# Patient Record
Sex: FEMALE | Race: BLACK OR AFRICAN AMERICAN | NOT HISPANIC OR LATINO
[De-identification: names, ages, dates, MRNs, and addresses within clinical notes are randomized per-mention and may not be internally consistent; named-entity substitution may affect disease eponyms.]

---

## 2017-09-04 PROBLEM — Z00.00 ENCOUNTER FOR PREVENTIVE HEALTH EXAMINATION: Status: ACTIVE | Noted: 2017-09-04

## 2019-01-12 ENCOUNTER — APPOINTMENT (OUTPATIENT)
Dept: ORTHOPEDIC SURGERY | Facility: CLINIC | Age: 64
End: 2019-01-12
Payer: COMMERCIAL

## 2019-01-12 VITALS
BODY MASS INDEX: 28.32 KG/M2 | HEIGHT: 65 IN | HEART RATE: 88 BPM | SYSTOLIC BLOOD PRESSURE: 165 MMHG | DIASTOLIC BLOOD PRESSURE: 99 MMHG | WEIGHT: 170 LBS

## 2019-01-12 DIAGNOSIS — Z78.9 OTHER SPECIFIED HEALTH STATUS: ICD-10-CM

## 2019-01-12 DIAGNOSIS — M75.121 COMPLETE ROTATOR CUFF TEAR OR RUPTURE OF RIGHT SHOULDER, NOT SPECIFIED AS TRAUMATIC: ICD-10-CM

## 2019-01-12 PROCEDURE — 99204 OFFICE O/P NEW MOD 45 MIN: CPT

## 2019-01-12 PROCEDURE — 73030 X-RAY EXAM OF SHOULDER: CPT | Mod: RT

## 2019-01-12 RX ORDER — MELOXICAM 15 MG/1
15 TABLET ORAL
Qty: 30 | Refills: 1 | Status: ACTIVE | COMMUNITY
Start: 2019-01-12 | End: 1900-01-01

## 2019-01-12 NOTE — PHYSICAL EXAM
[de-identified] : Patient is well appearing, in non acute distress with nonlabored breathing and appropriate mood and affect. Extra-ocular movements intact and no nasal discharge.\par \par Right UE warm and well perfused; palpable radial pulse\par SILT C5-T1\par motor intact to finger flexion, wrist extension and flexion, elbow flexion and extension, supination and pronation, deltoid\par Patient has significant pain with deltoid testing with 3/5 motor strength\par \par Left UE warm and well perfused; palpable radial pulse\par SILT C5-T1\par motor intact to finger flexion, wrist extension and flexion, elbow flexion and extension, supination and pronation, deltoid\par \par Right Shoulder \par \par Appearance\par negative atrophy of musculature\par negative winging\par \par Tenderness to Palpation about the shoulder is significantly tender at greater tuberosity\par \par Range of Motion: Active / Passive\par Forward Elevation: 30/70 with a lot of pain\par Abduction: 30/60 with a lot of pain\par ER at 0 degrees of abduction: 15/20\par ER at 90 degrees of abduction: Unable to test\par Internal Rotation L1\par \par Motor Strength unable test secondary to significant pain\par \par Provocative Maneuvers unable to test secondary to significant pain\par \par Left Shoulder \par \par Appearance\par negative atrophy of musculature\par negative winging\par \par Tenderness to Palpation about the shoulder is negative\par \par Range of Motion: Active / Passive\par Forward Elevation: full \par Abduction: full \par ER at 0 degrees of abduction: full \par ER at 90 degrees of abduction: full \par Internal Rotation full\par  [de-identified] : Right Shoulder Xray was visualized and reviewed by me\par well preserved glenohumeral joint space with narrowing of acromioclavicular joint space\par no fracture or dislocation\par calcification seen in soft tissue and subacromial space that appears to be more consistent with calcific tendinitis vs fracture, particularly given no traumatic injury; however, on scap y view, there is a second area of calcification that is concerning for fx

## 2019-01-12 NOTE — CONSULT LETTER
[Dear  ___] : Dear  [unfilled], [Consult Letter:] : I had the pleasure of evaluating your patient, [unfilled]. [( Thank you for referring [unfilled] for consultation for _____ )] : Thank you for referring [unfilled] for consultation for [unfilled] [Please see my note below.] : Please see my note below. [Consult Closing:] : Thank you very much for allowing me to participate in the care of this patient.  If you have any questions, please do not hesitate to contact me. [Sincerely,] : Sincerely, [FreeTextEntry2] : CHAR LUZ,EMILIO JENKINS\par  [FreeTextEntry3] : Moraima Grant MD\par [facility]\par [address]\par [place] [id number]\par Tel [phone]\par

## 2019-01-12 NOTE — REVIEW OF SYSTEMS
[Joint Pain] : joint pain [Joint Stiffness] : joint stiffness [Negative] : Heme/Lymph [FreeTextEntry9] : Right shoulder

## 2019-01-12 NOTE — HISTORY OF PRESENT ILLNESS
[de-identified] : Ms. MARIE is a 63 year old female who presents with Right shoulder pain. Patient states she was transferring a patient from the wheelchair to the bed. Started 2 weeks ago.  Pain did not start right away; started feeling pain a day later.  Pain is at upper lateral shoulder.  No DM.  No thyroid issues.  Went to PCP and was given muscle relaxer and ibuprofen 500mg.  These have helped and pain has lessened.  No PT.  No injs.  No pain in shoulder prior to this.  \par \par Hand Dominance: RHD\par Location: Right shoulder\par Timin weeks\par Quality: burning\par Severity:10/10\par Treatments Tried: ice, heat, tiger balm, ibuprofen\par Associated Signs and Symptoms: worst at night, wakes patient from sleep\par What makes it worse: lifting, movement\par What makes it better:rest\par Improving/Worsening/Unchanged: improving\par PT hx: no pt\par

## 2019-01-12 NOTE — ASSESSMENT
[FreeTextEntry1] : Patient is a 63-year-old female with acute onset of right shoulder pain after attempting to transfer patient to weeks ago. Patient has signs and symptoms concerning for a complete rotator cuff tear versus fracture versus calcific tendinitis. We'll order an MRI to better evaluate for this. I have also ordered meloxicam for patient and instructed her to stop using ibuprofen and other NSAIDs.  Risks discussed. The patient will followup after MRI

## 2019-01-27 ENCOUNTER — OUTPATIENT (OUTPATIENT)
Dept: OUTPATIENT SERVICES | Facility: HOSPITAL | Age: 64
LOS: 1 days | End: 2019-01-27
Payer: COMMERCIAL

## 2019-01-27 ENCOUNTER — APPOINTMENT (OUTPATIENT)
Dept: MRI IMAGING | Facility: CLINIC | Age: 64
End: 2019-01-27
Payer: COMMERCIAL

## 2019-01-27 DIAGNOSIS — M75.121 COMPLETE ROTATOR CUFF TEAR OR RUPTURE OF RIGHT SHOULDER, NOT SPECIFIED AS TRAUMATIC: ICD-10-CM

## 2019-01-27 DIAGNOSIS — M75.31 CALCIFIC TENDINITIS OF RIGHT SHOULDER: ICD-10-CM

## 2019-01-27 PROCEDURE — 73221 MRI JOINT UPR EXTREM W/O DYE: CPT

## 2019-01-27 PROCEDURE — 73221 MRI JOINT UPR EXTREM W/O DYE: CPT | Mod: 26,RT

## 2019-02-28 ENCOUNTER — APPOINTMENT (OUTPATIENT)
Dept: ORTHOPEDIC SURGERY | Facility: CLINIC | Age: 64
End: 2019-02-28
Payer: COMMERCIAL

## 2019-02-28 VITALS
BODY MASS INDEX: 28.32 KG/M2 | DIASTOLIC BLOOD PRESSURE: 101 MMHG | HEART RATE: 110 BPM | WEIGHT: 170 LBS | SYSTOLIC BLOOD PRESSURE: 158 MMHG | HEIGHT: 65 IN

## 2019-02-28 PROCEDURE — 99214 OFFICE O/P EST MOD 30 MIN: CPT

## 2019-02-28 NOTE — ASSESSMENT
[FreeTextEntry1] : Patient is a 63-year-old female who presents for followup of right shoulder pain. Patient has been taking meloxicam every few days with significant relief. Her range of motion is very good but she does have some weakness in her rotator cuff muscles. Her MRI her rotator cuff is intact. I recommended patient continue meloxicam and she may try a daily for 2-4 weeks and then after that as needed; was discussed. Additionally I have ordered therapy to help with the improvement of her shoulder motion and strength. Patient agrees with plan and will followup as needed.

## 2019-02-28 NOTE — PHYSICAL EXAM
[de-identified] : Patient is well appearing, in non acute distress with nonlabored breathing and appropriate mood and affect. Extra-ocular movements intact and no nasal discharge.\par \par Right UE warm and well perfused; palpable radial pulse\par SILT C5-T1\par motor intact to finger flexion, wrist extension and flexion, elbow flexion and extension, supination and pronation, deltoid\par \par Left UE warm and well perfused; palpable radial pulse\par SILT C5-T1\par motor intact to finger flexion, wrist extension and flexion, elbow flexion and extension, supination and pronation, deltoid\par \par \par Right Shoulder \par \par Appearance\par negative atrophy of musculature\par negative winging\par \par Tenderness to Palpation about the shoulder is negative\par \par Range of Motion: Active / Passive\par Forward Elevation: full \par Abduction: full \par ER at 0 degrees of abduction: full \par ER at 90 degrees of abduction: full \par Internal Rotation full\par \par Motor Strength\par Supraspinatus: 3/5\par Infraspinatus: 5/5\par Subscapularis: 5 /5\par Teres Minor: 5/ 5\par \par Provocative Maneuvers\par negative Drop Arm\par negative Belly Press\par negative Hornblowers\par Positive Kwon\par Positive Neer\par negative Speeds [de-identified] : Patient had an MRI of her right shoulder on January 27, 2019. The images are visualized are reviewed by me. Findings below:\par \par Along the anterior mid-insertional fibers of supraspinatus tendon there are foci globular hypointense signal consistent with calcific tendinosis. A portion of this deposit erosions the adjacent greater tuberosity where there is mild surrounding osseous edema. This deposit measures approximately 1.2 x 1 x 1.2 cm in total. There is mild surrounding tristen-tenderness edema and moderate subacromial subdeltoid bursitis. There is moderate supraspinatus tendinosis without discrete tear. Infraspinatus and teres minor tendons are intact. There is mild tendinosis of the subscapularis tendon without discrete tear. There's no fatty atrophy of the rotator cuff muscles. There is mild fatty infiltration of the lateral and posterior fibers the deltoid muscle. Long head of the biceps tendon is intact. There is increased signal undercutting the chondral labral junction of the anterior labrum consistent with a partial thickness nondisplaced labral tear. There is mild thickening and edema within the anterior band of the right EHL ligament which is nonspecific but to be seen with adhesive capsulitis the moderate a.c. joint arthrosis.

## 2019-02-28 NOTE — HISTORY OF PRESENT ILLNESS
[de-identified] : Ms. MARIE is a 63 year old female who is being seen for Follow-up of Right shoulder. Patient states pain has improved about 70 percent. Movement has improved as well. Patient takes meloxicam prn; every 2-3 days.  Getting better.  Sometimes it bothers her at night still but meloxicam helps.  \par

## 2019-03-02 ENCOUNTER — EMERGENCY (EMERGENCY)
Facility: HOSPITAL | Age: 64
LOS: 1 days | Discharge: HOME | End: 2019-03-02
Attending: EMERGENCY MEDICINE | Admitting: EMERGENCY MEDICINE
Payer: SELF-PAY

## 2019-03-02 VITALS
WEIGHT: 175.93 LBS | SYSTOLIC BLOOD PRESSURE: 164 MMHG | HEIGHT: 65 IN | HEART RATE: 78 BPM | TEMPERATURE: 99 F | RESPIRATION RATE: 16 BRPM | DIASTOLIC BLOOD PRESSURE: 87 MMHG | OXYGEN SATURATION: 100 %

## 2019-03-02 VITALS
DIASTOLIC BLOOD PRESSURE: 82 MMHG | OXYGEN SATURATION: 97 % | SYSTOLIC BLOOD PRESSURE: 121 MMHG | HEART RATE: 75 BPM | RESPIRATION RATE: 16 BRPM | TEMPERATURE: 99 F

## 2019-03-02 LAB
ALBUMIN SERPL ELPH-MCNC: 3.7 G/DL — SIGNIFICANT CHANGE UP (ref 3.3–5)
ALP SERPL-CCNC: 92 U/L — SIGNIFICANT CHANGE UP (ref 30–120)
ALT FLD-CCNC: 20 U/L DA — SIGNIFICANT CHANGE UP (ref 10–60)
ANION GAP SERPL CALC-SCNC: 7 MMOL/L — SIGNIFICANT CHANGE UP (ref 5–17)
AST SERPL-CCNC: 16 U/L — SIGNIFICANT CHANGE UP (ref 10–40)
BASOPHILS # BLD AUTO: 0.07 K/UL — SIGNIFICANT CHANGE UP (ref 0–0.2)
BASOPHILS NFR BLD AUTO: 0.9 % — SIGNIFICANT CHANGE UP (ref 0–2)
BILIRUB SERPL-MCNC: 0.4 MG/DL — SIGNIFICANT CHANGE UP (ref 0.2–1.2)
BUN SERPL-MCNC: 13 MG/DL — SIGNIFICANT CHANGE UP (ref 7–23)
CALCIUM SERPL-MCNC: 9.3 MG/DL — SIGNIFICANT CHANGE UP (ref 8.4–10.5)
CHLORIDE SERPL-SCNC: 102 MMOL/L — SIGNIFICANT CHANGE UP (ref 96–108)
CO2 SERPL-SCNC: 30 MMOL/L — SIGNIFICANT CHANGE UP (ref 22–31)
CREAT SERPL-MCNC: 0.68 MG/DL — SIGNIFICANT CHANGE UP (ref 0.5–1.3)
EOSINOPHIL # BLD AUTO: 0.3 K/UL — SIGNIFICANT CHANGE UP (ref 0–0.5)
EOSINOPHIL NFR BLD AUTO: 3.9 % — SIGNIFICANT CHANGE UP (ref 0–6)
GLUCOSE SERPL-MCNC: 114 MG/DL — HIGH (ref 70–99)
HCT VFR BLD CALC: 34.6 % — SIGNIFICANT CHANGE UP (ref 34.5–45)
HGB BLD-MCNC: 11.1 G/DL — LOW (ref 11.5–15.5)
IMM GRANULOCYTES NFR BLD AUTO: 0.1 % — SIGNIFICANT CHANGE UP (ref 0–1.5)
LYMPHOCYTES # BLD AUTO: 3.76 K/UL — HIGH (ref 1–3.3)
LYMPHOCYTES # BLD AUTO: 49.5 % — HIGH (ref 13–44)
MAGNESIUM SERPL-MCNC: 2 MG/DL — SIGNIFICANT CHANGE UP (ref 1.6–2.6)
MCHC RBC-ENTMCNC: 28 PG — SIGNIFICANT CHANGE UP (ref 27–34)
MCHC RBC-ENTMCNC: 32.1 GM/DL — SIGNIFICANT CHANGE UP (ref 32–36)
MCV RBC AUTO: 87.4 FL — SIGNIFICANT CHANGE UP (ref 80–100)
MONOCYTES # BLD AUTO: 0.56 K/UL — SIGNIFICANT CHANGE UP (ref 0–0.9)
MONOCYTES NFR BLD AUTO: 7.4 % — SIGNIFICANT CHANGE UP (ref 2–14)
NEUTROPHILS # BLD AUTO: 2.9 K/UL — SIGNIFICANT CHANGE UP (ref 1.8–7.4)
NEUTROPHILS NFR BLD AUTO: 38.2 % — LOW (ref 43–77)
NRBC # BLD: 0 /100 WBCS — SIGNIFICANT CHANGE UP (ref 0–0)
PLATELET # BLD AUTO: 248 K/UL — SIGNIFICANT CHANGE UP (ref 150–400)
POTASSIUM SERPL-MCNC: 3.8 MMOL/L — SIGNIFICANT CHANGE UP (ref 3.5–5.3)
POTASSIUM SERPL-SCNC: 3.8 MMOL/L — SIGNIFICANT CHANGE UP (ref 3.5–5.3)
PROT SERPL-MCNC: 7.9 G/DL — SIGNIFICANT CHANGE UP (ref 6–8.3)
RBC # BLD: 3.96 M/UL — SIGNIFICANT CHANGE UP (ref 3.8–5.2)
RBC # FLD: 13.2 % — SIGNIFICANT CHANGE UP (ref 10.3–14.5)
SODIUM SERPL-SCNC: 139 MMOL/L — SIGNIFICANT CHANGE UP (ref 135–145)
WBC # BLD: 7.6 K/UL — SIGNIFICANT CHANGE UP (ref 3.8–10.5)
WBC # FLD AUTO: 7.6 K/UL — SIGNIFICANT CHANGE UP (ref 3.8–10.5)

## 2019-03-02 PROCEDURE — 70450 CT HEAD/BRAIN W/O DYE: CPT

## 2019-03-02 PROCEDURE — 36415 COLL VENOUS BLD VENIPUNCTURE: CPT

## 2019-03-02 PROCEDURE — 80053 COMPREHEN METABOLIC PANEL: CPT

## 2019-03-02 PROCEDURE — 99284 EMERGENCY DEPT VISIT MOD MDM: CPT

## 2019-03-02 PROCEDURE — 70450 CT HEAD/BRAIN W/O DYE: CPT | Mod: 26

## 2019-03-02 PROCEDURE — 83735 ASSAY OF MAGNESIUM: CPT

## 2019-03-02 PROCEDURE — 93010 ELECTROCARDIOGRAM REPORT: CPT

## 2019-03-02 PROCEDURE — 93005 ELECTROCARDIOGRAM TRACING: CPT

## 2019-03-02 PROCEDURE — 85027 COMPLETE CBC AUTOMATED: CPT

## 2019-03-02 PROCEDURE — 99284 EMERGENCY DEPT VISIT MOD MDM: CPT | Mod: 25

## 2019-03-02 RX ORDER — METFORMIN HYDROCHLORIDE 850 MG/1
1 TABLET ORAL
Qty: 0 | Refills: 0 | COMMUNITY

## 2019-03-02 RX ORDER — MELOXICAM 15 MG/1
1 TABLET ORAL
Qty: 0 | Refills: 0 | COMMUNITY

## 2019-03-02 NOTE — ED PROVIDER NOTE - OBJECTIVE STATEMENT
64 y/o F with hx of HTN, ?DM presents with c/o intermittent numbness to tip of nose and upper lip since 3pm(1500) today. Pt states that she also had mild lightheadedness and blurry vision B an hour ago (1900) which lasted a few minutes and has resolved since. Denies headache, tingling, numbness to extremities, focal weakness, CP, SOB, trauma, family hx of CVA or MI, n/v or other symptoms. Admits to family hx of HTN and HLD.

## 2019-03-02 NOTE — ED PROVIDER NOTE - ATTENDING CONTRIBUTION TO CARE
Brandon Antoine MD: I have personally performed a face to face diagnostic evaluation on this patient.  I have reviewed the PA note and agree with the history, exam, and plan of care, except as noted.  History and Exam by me shows same findings as documented  Attending Note: Patient with intermittent numbness to nose and upper lip for 5-6 hours. Non-lateralizing. No other significant symptoms. Exam unremarkable. Recent misunderstanding with medications. Agree with labs and CT

## 2019-03-02 NOTE — ED ADULT NURSE NOTE - CHPI ED NUR SYMPTOMS NEG
no fever/no nausea/no confusion/no change in level of consciousness/no dizziness/no loss of consciousness/no weakness/no blurred vision/no vomiting

## 2019-03-02 NOTE — ED PROVIDER NOTE - CLINICAL SUMMARY MEDICAL DECISION MAKING FREE TEXT BOX
64 y/o F with hx of HTN, ?DM c/o intermittent numbness to upper lip and tip of nose since 3pm today, mild lightheaded and blurry vision at 7pm which lasted few mins and resolved, no neuro deficits on exam, VSS, will get labs, ct head, ekg, re-assess

## 2019-03-02 NOTE — ED PROVIDER NOTE - PROGRESS NOTE DETAILS
Pt examined by ED attending, Dr. Antoine who agreed with disposition and plan. Reevaluated patient at bedside.  Patient feeling much improved.  Discussed the results of all diagnostic testing in ED and copies of all reports given.   An opportunity to ask questions was given.  Discussed the importance of prompt, close medical follow-up.  Patient will return with any changes, concerns or persistent / worsening symptoms.  Understanding of all instructions verbalized.

## 2019-03-02 NOTE — ED PROVIDER NOTE - CHPI ED SYMPTOMS NEG
no loss of consciousness/no nausea/no weakness/no confusion/no fever/no vomiting/no change in level of consciousness

## 2019-05-06 ENCOUNTER — APPOINTMENT (OUTPATIENT)
Dept: ORTHOPEDIC SURGERY | Facility: CLINIC | Age: 64
End: 2019-05-06
Payer: COMMERCIAL

## 2019-05-06 VITALS
HEIGHT: 66 IN | HEART RATE: 105 BPM | SYSTOLIC BLOOD PRESSURE: 159 MMHG | DIASTOLIC BLOOD PRESSURE: 99 MMHG | WEIGHT: 170 LBS | BODY MASS INDEX: 27.32 KG/M2

## 2019-05-06 DIAGNOSIS — M75.31 CALCIFIC TENDINITIS OF RIGHT SHOULDER: ICD-10-CM

## 2019-05-06 PROCEDURE — 99212 OFFICE O/P EST SF 10 MIN: CPT

## 2019-05-06 RX ORDER — MELOXICAM 15 MG/1
15 TABLET ORAL DAILY
Qty: 30 | Refills: 1 | Status: ACTIVE | COMMUNITY
Start: 2019-05-06 | End: 1900-01-01

## 2019-09-20 ENCOUNTER — APPOINTMENT (OUTPATIENT)
Dept: GASTROENTEROLOGY | Facility: CLINIC | Age: 64
End: 2019-09-20

## 2019-09-26 NOTE — ED ADULT TRIAGE NOTE - SPO2 (%)
09/25/19 1900   Treatment Plan   Plan Type Updated Plan   Goals   Patient Reported Goal #1 Improvement in overall mood   Pt Reported Goal #1 Type Long Term Goal  (9/29/19)   Goal #1 Progression Outcome Not Met - continue to monitor   Goal #2 Less suicidal thoughts   Goal #2 Type Long Term Goal   Goal #2 Progression Outcome Met - continue evaluating goal progress toward completion   Additional Medical Outcomes Pain   Pain Outcome Acceptable pain/comfort level is achieved/maintained.   Pain Outcome Progression Outcome Met - continue evaluating goal progress toward completion   Focus Indicators   Indicators Anxiety;Lack of Emotional Support;Social Withdrawl;Negative Thoughts;Mood Swings;Sad/Tearful;Decreased Appetite;Decreased Energy;Poor Judgement/Impulsive Behavior;Physical Pain;Suicidal Ideation;Urge To Harm Self   Patient Objectives   Objectives Patient will attend and participate in therapeutic groups;Patient will collaborate with treatment team in planning continuing care;Patient will comply with behavioral expectations established by staff;Patient will participate in developing a Crisis Survival Plan   Therapy Interventions   Interventions Encourage group attendance to learn and reinforce positive coping strategies;Encourage identification and sharing of feelings in group to practice appropriate outlets;Encourage patient to practice use of community resources and report challenges and successes;Psychotherapist to complete Crisis Survival Plan with patient;Discuss role medications play in symptom management   Treatment Plan Agreement   Patient has reviewed and agrees to the above treatment plan Yes     Pt denies SI, SH, HI and AVH; however, reported feeling depressed and having nonstop racing thoughts of everything I have to do when I leave. Writer encouraged pt to focus on the here and now and practice deep breathing, which pt reported went well. Pt's friend came to visit, which pt reported went well. Pt's   also came to visit and pt reported, \"Feel overwhelmed by him because he comes here and just talks about his stressors\". Pt reported looking forward to the family session with her  tomorrow. Pt verbalized will notify staff of any safety concerns. Pt was compliant with medications; received PRN trazodone 50 mg for sleep and atarax 50 mg for anxiety at 2201, medication was effective. Continue to monitor pt per protocol.     100

## 2019-10-18 ENCOUNTER — APPOINTMENT (OUTPATIENT)
Dept: COLORECTAL SURGERY | Facility: CLINIC | Age: 64
End: 2019-10-18
Payer: COMMERCIAL

## 2019-10-18 VITALS
OXYGEN SATURATION: 99 % | BODY MASS INDEX: 29.25 KG/M2 | SYSTOLIC BLOOD PRESSURE: 161 MMHG | DIASTOLIC BLOOD PRESSURE: 99 MMHG | HEIGHT: 66 IN | WEIGHT: 182 LBS | HEART RATE: 84 BPM | TEMPERATURE: 98.6 F

## 2019-10-18 DIAGNOSIS — I10 ESSENTIAL (PRIMARY) HYPERTENSION: ICD-10-CM

## 2019-10-18 DIAGNOSIS — Z80.0 FAMILY HISTORY OF MALIGNANT NEOPLASM OF DIGESTIVE ORGANS: ICD-10-CM

## 2019-10-18 DIAGNOSIS — Z12.11 ENCOUNTER FOR SCREENING FOR MALIGNANT NEOPLASM OF COLON: ICD-10-CM

## 2019-10-18 PROCEDURE — 99203 OFFICE O/P NEW LOW 30 MIN: CPT

## 2019-10-18 RX ORDER — LISINOPRIL 30 MG/1
TABLET ORAL
Refills: 0 | Status: ACTIVE | COMMUNITY

## 2019-10-18 NOTE — PHYSICAL EXAM
[Normal Rate and Rhythm] : normal rate and rhythm [Respiratory Effort] : normal respiratory effort [Calm] : calm [de-identified] : well appearing, in no distress [de-identified] : soft, non tender, non distended. Midline surgical incision [de-identified] : normocephalic, atraumatic [de-identified] : moves extremities without difficulty [de-identified] : warm and dry [de-identified] : alert and oriented x 3

## 2019-10-18 NOTE — HISTORY OF PRESENT ILLNESS
[FreeTextEntry1] : 64 year old female who presents for consultation for a colonoscopy. She had one about 13-15 years ago. She denies any GI symptoms such as abdominal pain, nausea, vomiting, changes in bowel function, melena or hematochezia. Father had colon cancer.

## 2019-10-18 NOTE — CONSULT LETTER
[Dear  ___] : Dear  [unfilled], [Consult Closing:] : Thank you very much for allowing me to participate in the care of this patient.  If you have any questions, please do not hesitate to contact me. [Consult Letter:] : I had the pleasure of evaluating your patient, [unfilled]. [Please see my note below.] : Please see my note below. [FreeTextEntry1] : She is in need of a screening colonoscopy which would be scheduled in the near future and you would be notified of the findings.  [FreeTextEntry3] : Gume Kauffman MD\par  [Sincerely,] : Sincerely,

## 2019-10-31 ENCOUNTER — APPOINTMENT (OUTPATIENT)
Dept: OBGYN | Facility: CLINIC | Age: 64
End: 2019-10-31
Payer: COMMERCIAL

## 2019-10-31 VITALS
BODY MASS INDEX: 29.16 KG/M2 | HEART RATE: 81 BPM | HEIGHT: 65 IN | WEIGHT: 175 LBS | DIASTOLIC BLOOD PRESSURE: 74 MMHG | SYSTOLIC BLOOD PRESSURE: 115 MMHG

## 2019-10-31 DIAGNOSIS — Z01.419 ENCOUNTER FOR GYNECOLOGICAL EXAMINATION (GENERAL) (ROUTINE) W/OUT ABNORMAL FINDINGS: ICD-10-CM

## 2019-10-31 PROCEDURE — 99386 PREV VISIT NEW AGE 40-64: CPT

## 2019-10-31 NOTE — HISTORY OF PRESENT ILLNESS
[___ Month(s) Ago] : [unfilled] month(s) ago [Good] : being in good health [Last Mammogram ___] : Last Mammogram was [unfilled] [Last Bone Density ___] : Last bone density studies [unfilled] [Last Colonoscopy ___] : Last colonoscopy [unfilled] [Last Pap ___] : Last cervical pap smear was [unfilled] [Postmenopausal] : is postmenopausal [Sexually Active] : is sexually active [Monogamous] : is monogamous [Male ___] : [unfilled] male [HIV] : HIV - [HSV] : HSV - [RPR] : RPR - [Hepatitis] : Hepatitis - [Chlamydia] : Chlamydia - [Gonorrhea] : Gonorrhea - [HPV] : HPV - [de-identified] : with partner for over 5 years

## 2019-10-31 NOTE — PHYSICAL EXAM
[Awake] : awake [Alert] : alert [Soft] : soft [Oriented x3] : oriented to person, place, and time [Normal] : vagina [No Bleeding] : there was no active vaginal bleeding [Absent] : absent [Acute Distress] : no acute distress [Mass] : no breast mass [Nipple Discharge] : no nipple discharge [Axillary LAD] : no axillary lymphadenopathy [Tender] : non tender

## 2019-11-02 LAB — HPV HIGH+LOW RISK DNA PNL CVX: NOT DETECTED

## 2019-11-05 LAB — CYTOLOGY CVX/VAG DOC THIN PREP: ABNORMAL

## 2019-11-14 ENCOUNTER — ASOB RESULT (OUTPATIENT)
Age: 64
End: 2019-11-14

## 2019-11-14 ENCOUNTER — APPOINTMENT (OUTPATIENT)
Dept: ANTEPARTUM | Facility: CLINIC | Age: 64
End: 2019-11-14
Payer: COMMERCIAL

## 2019-11-14 PROCEDURE — 76830 TRANSVAGINAL US NON-OB: CPT

## 2019-11-14 PROCEDURE — 76856 US EXAM PELVIC COMPLETE: CPT | Mod: 59

## 2019-12-12 ENCOUNTER — APPOINTMENT (OUTPATIENT)
Dept: OBGYN | Facility: CLINIC | Age: 64
End: 2019-12-12
Payer: COMMERCIAL

## 2019-12-12 VITALS
DIASTOLIC BLOOD PRESSURE: 85 MMHG | SYSTOLIC BLOOD PRESSURE: 159 MMHG | HEIGHT: 65 IN | HEART RATE: 79 BPM | WEIGHT: 182.5 LBS | BODY MASS INDEX: 30.41 KG/M2

## 2019-12-12 DIAGNOSIS — Z71.2 PERSON CONSULTING FOR EXPLANATION OF EXAMINATION OR TEST FINDINGS: ICD-10-CM

## 2019-12-12 PROCEDURE — 99213 OFFICE O/P EST LOW 20 MIN: CPT

## 2019-12-12 NOTE — CHIEF COMPLAINT
[Follow Up] : follow up GYN visit [FreeTextEntry1] : 63 yo P5 LMP Hysterectomy in 2004 presents for pelvic sono result. She has no Gyn complaints.\par Patient reports was scheduled to have colonoscopy on 12/11/2019, reports  recovering from a cold, procedure was rescheduled.\par She reports has not yet made appt for colonoscopy,\par 11/5/2019- vaginal pap and HPV HR negative\par Pelvic sono done on 11/14/19-  Uterus and cervix surgically absent. Ovaries were not visualized.

## 2020-01-09 ENCOUNTER — FORM ENCOUNTER (OUTPATIENT)
Age: 65
End: 2020-01-09

## 2020-01-10 ENCOUNTER — APPOINTMENT (OUTPATIENT)
Dept: RADIOLOGY | Facility: CLINIC | Age: 65
End: 2020-01-10
Payer: COMMERCIAL

## 2020-01-10 ENCOUNTER — OUTPATIENT (OUTPATIENT)
Dept: OUTPATIENT SERVICES | Facility: HOSPITAL | Age: 65
LOS: 1 days | End: 2020-01-10
Payer: COMMERCIAL

## 2020-01-10 DIAGNOSIS — Z00.00 ENCOUNTER FOR GENERAL ADULT MEDICAL EXAMINATION WITHOUT ABNORMAL FINDINGS: ICD-10-CM

## 2020-01-10 PROCEDURE — 77080 DXA BONE DENSITY AXIAL: CPT | Mod: 26

## 2020-01-10 PROCEDURE — 77080 DXA BONE DENSITY AXIAL: CPT

## 2020-02-05 ENCOUNTER — APPOINTMENT (OUTPATIENT)
Dept: OBGYN | Facility: CLINIC | Age: 65
End: 2020-02-05
Payer: COMMERCIAL

## 2020-02-05 VITALS
BODY MASS INDEX: 30.49 KG/M2 | DIASTOLIC BLOOD PRESSURE: 68 MMHG | SYSTOLIC BLOOD PRESSURE: 118 MMHG | HEIGHT: 65 IN | WEIGHT: 183 LBS

## 2020-02-05 DIAGNOSIS — M80.80XD OTHER OSTEOPOROSIS WITH CURRENT PATHOLOGICAL FRACTURE, UNSPECIFIED SITE, SUBSEQUENT ENCOUNTER FOR FRACTURE WITH ROUTINE HEALING: ICD-10-CM

## 2020-02-05 DIAGNOSIS — M81.0 AGE-RELATED OSTEOPOROSIS W/OUT CURRENT PATHOLOGICAL FRACTURE: ICD-10-CM

## 2020-02-05 PROCEDURE — 99213 OFFICE O/P EST LOW 20 MIN: CPT

## 2020-02-05 RX ORDER — MULTIVITAMIN/IRON/FOLIC ACID 18MG-0.4MG
600-400 TABLET ORAL
Qty: 180 | Refills: 3 | Status: ACTIVE | COMMUNITY
Start: 2020-02-05 | End: 1900-01-01

## 2020-02-05 RX ORDER — ALENDRONATE SODIUM 70 MG/1
70 TABLET ORAL
Qty: 12 | Refills: 3 | Status: ACTIVE | COMMUNITY
Start: 2020-02-05 | End: 1900-01-01

## 2020-02-14 NOTE — CHIEF COMPLAINT
[FreeTextEntry1] : 65 yo P5 with hx of hysterectomy in 2004 presents to discuss treatment options for osteoporosis. She has no complaints.

## 2020-12-21 PROBLEM — Z12.11 ENCOUNTER FOR SCREENING COLONOSCOPY: Status: RESOLVED | Noted: 2019-10-18 | Resolved: 2020-12-21

## 2020-12-21 PROBLEM — Z01.419 ENCOUNTER FOR GYNECOLOGICAL EXAMINATION: Status: RESOLVED | Noted: 2019-10-31 | Resolved: 2020-12-21

## 2021-05-20 NOTE — ED ADULT NURSE NOTE - CAS EDP DISCH TYPE
[FreeTextEntry1] : (1) Stage II CKD with a prior episode of VILLA secondary to dehydration.   The recent BUN/creatinine levels are 28/1.3 (03/11/2019), 32/1.2 (09/09/2019), 27/1.6 (12/30/2019), 25/1.41 (05/28/2020),  25/1.2 (12/14/2020), and most recently 27/1.32 (02/16/2021),  37/1.2 (04/14/2021).  The eGFR is 58.8 mL per minute.  The urine microalbumin to creatinine ration on this last date was 334.2 mcg per mg.  The urinalysis demonstrated 30 mg/dL of albumin.\par \par (2) Hypertension.  Trivially elevated.  \par \par (3) Proteinuria. The last urine microalbumin to creatinine ratio was 77 mg per gram.  I don't have a recent one. Contributors are likely the hypertension, diabetes mellitus, and obesity. \par \par (4) Diabetes mellitus. The recent HbA1c was 6.2% (04/14/2021).  Following this result the glimepiride was discontinued.\par \par (5) Low 25 hydroxy vitamin D.  Taking oral vitamin D 2000 IU daily. \par \par (6) Hypomagnesemia.  I suspect this is related to the use of a PPI.  I spoke with Mr. Corona on 04/16/2021 when I received a serum magnesium of 1.0 mg/dL.  He felt strongly about not stopping the PPI due to significant reflux. He stopped taking pantoprazole and after 5 days developed significant reflux symptoms.  He restarted the pantoprazole.  I ordered magnesium oxide 500 mg PO BID which he has since taken.  \par \par RECOMMENDATIONS:\par \par (1) Continue the current medication regimen.\par (2) Continue magnesium oxide.  I will check the magnesium levels on the next of blood tests.  I will check a fractional excretion of magnesium on the next set of blood tests.  I asked Mr. Corona to not take his vitamins or magnesium  prior to test. \par (3) Avoid the use of salt (Mr. Corona currently cooks with salt).\par (4)  Follow the blood pressures at home.  I expalined that the goal systolic pressure is under 130 mm Hg. \par (5) I would like to add a small dose of an ARB for control of the proteinuria. I ordered losartan 25 mg PO daily.\par (6) Continue the allopurinol.  The goal serum uric acid is under 6 mg/dL.\par (7) Stay well hydrated.\par (8) Mr. Corona will obtain a BMP, magnesium, urine mictroalbumin, creatinine, urine magnesium and serum magnesium in one month.  I will call him with the results.\par (9) I will see Mr. Corona back in 6 months with CMP, magnesium,  25 hydroxy vitamin D, urinalysis, random urine microalbumin and creatinine, serum magnesium,  serum uric acid.\par \par 
Home

## 2023-07-19 ENCOUNTER — APPOINTMENT (OUTPATIENT)
Dept: OBGYN | Facility: CLINIC | Age: 68
End: 2023-07-19
Payer: MEDICARE

## 2023-07-19 VITALS
DIASTOLIC BLOOD PRESSURE: 83 MMHG | HEIGHT: 65 IN | WEIGHT: 179 LBS | SYSTOLIC BLOOD PRESSURE: 155 MMHG | BODY MASS INDEX: 29.82 KG/M2

## 2023-07-19 DIAGNOSIS — Z12.4 ENCOUNTER FOR SCREENING FOR MALIGNANT NEOPLASM OF CERVIX: ICD-10-CM

## 2023-07-19 DIAGNOSIS — Z90.710 ACQUIRED ABSENCE OF BOTH CERVIX AND UTERUS: ICD-10-CM

## 2023-07-19 DIAGNOSIS — Z86.79 PERSONAL HISTORY OF OTHER DISEASES OF THE CIRCULATORY SYSTEM: ICD-10-CM

## 2023-07-19 PROCEDURE — 99387 INIT PM E/M NEW PAT 65+ YRS: CPT

## 2023-07-21 PROBLEM — Z86.79 HISTORY OF ESSENTIAL HYPERTENSION: Status: RESOLVED | Noted: 2023-07-21 | Resolved: 2023-07-21

## 2023-07-21 PROBLEM — Z90.710 H/O HYSTERECTOMY FOR BENIGN DISEASE: Status: ACTIVE | Noted: 2023-07-21

## 2023-07-21 PROBLEM — Z12.4 CERVICAL CANCER SCREENING: Status: ACTIVE | Noted: 2023-07-21

## 2023-07-21 NOTE — PLAN
[FreeTextEntry1] : PLAN\par \par normal GYN exam\par PAP smear obtained\par \par Atrophic vagina- dsisucssed used of coconut iol for relief\par \par \par RTO 1 yr or PRN

## 2023-07-21 NOTE — PHYSICAL EXAM

## 2023-07-21 NOTE — HISTORY OF PRESENT ILLNESS
[Y] : Positive pregnancy history [Menarche Age: ____] : age at menarche was [unfilled] [FreeTextEntry1] : 67yo presents to establish GYN care with practice. History of partial hysterectomy for AUB and fibriods. Denies abnormal history of PAP smear\par LMP postmenopausal - 2004\par \par mammogram uptodate\par hx osteporosis\par uptodate on colon screen [PGxTotal] : 6 [BannerxFullTerm] : 6 [PGHxPremature] : 0 [[facility] CenterxLiving] : 6 [PGHxAbortions] : 0 [PGHxABInduced] : 0 [PGHxABSpont] : 0 [PGHxEctopic] : 0 [PGHxMultBirths] : 0

## 2023-07-24 LAB
C TRACH RRNA SPEC QL NAA+PROBE: NOT DETECTED
CYTOLOGY CVX/VAG DOC THIN PREP: ABNORMAL
HPV HIGH+LOW RISK DNA PNL CVX: NOT DETECTED
N GONORRHOEA RRNA SPEC QL NAA+PROBE: NOT DETECTED
SOURCE TP AMPLIFICATION: NORMAL

## 2025-02-08 NOTE — CHIEF COMPLAINT
[Annual Visit] : annual visit [FreeTextEntry1] : 65 yo P6 LMP Hysterectomy in 2004 presents for annual Gyn exam. Patient has no complaints. She reports since had hysterectomy had one Gyn exam.   Yes